# Patient Record
Sex: FEMALE | Race: WHITE | HISPANIC OR LATINO | Employment: FULL TIME | URBAN - METROPOLITAN AREA
[De-identification: names, ages, dates, MRNs, and addresses within clinical notes are randomized per-mention and may not be internally consistent; named-entity substitution may affect disease eponyms.]

---

## 2022-04-10 ENCOUNTER — OFFICE VISIT (OUTPATIENT)
Dept: URGENT CARE | Facility: URGENT CARE | Age: 22
End: 2022-04-10

## 2022-04-10 VITALS
HEART RATE: 87 BPM | OXYGEN SATURATION: 99 % | WEIGHT: 228.6 LBS | TEMPERATURE: 98.8 F | DIASTOLIC BLOOD PRESSURE: 88 MMHG | SYSTOLIC BLOOD PRESSURE: 135 MMHG

## 2022-04-10 DIAGNOSIS — K08.89 PAIN IN TOOTH: ICD-10-CM

## 2022-04-10 DIAGNOSIS — H66.002 NON-RECURRENT ACUTE SUPPURATIVE OTITIS MEDIA OF LEFT EAR WITHOUT SPONTANEOUS RUPTURE OF TYMPANIC MEMBRANE: ICD-10-CM

## 2022-04-10 DIAGNOSIS — R51.9 ACUTE INTRACTABLE HEADACHE, UNSPECIFIED HEADACHE TYPE: ICD-10-CM

## 2022-04-10 DIAGNOSIS — R07.0 THROAT PAIN: Primary | ICD-10-CM

## 2022-04-10 LAB
DEPRECATED S PYO AG THROAT QL EIA: NEGATIVE
GROUP A STREP BY PCR: NOT DETECTED

## 2022-04-10 PROCEDURE — 99203 OFFICE O/P NEW LOW 30 MIN: CPT | Performed by: NURSE PRACTITIONER

## 2022-04-10 PROCEDURE — 87651 STREP A DNA AMP PROBE: CPT | Performed by: NURSE PRACTITIONER

## 2022-04-10 RX ORDER — AMOXICILLIN 500 MG/1
500 CAPSULE ORAL 2 TIMES DAILY
Qty: 20 CAPSULE | Refills: 0 | Status: SHIPPED | OUTPATIENT
Start: 2022-04-10 | End: 2022-04-20

## 2022-04-10 RX ORDER — NAPROXEN 500 MG/1
500 TABLET ORAL 2 TIMES DAILY WITH MEALS
Qty: 20 TABLET | Refills: 0 | COMMUNITY
Start: 2022-04-10 | End: 2022-04-20

## 2022-04-10 NOTE — PROGRESS NOTES
Assessment & Plan     Throat pain  - Streptococcus A Rapid Screen w/Reflex to PCR  - Group A Streptococcus PCR Throat Swab    Non-recurrent acute suppurative otitis media of left ear without spontaneous rupture of tympanic membrane  - amoxicillin (AMOXIL) 500 MG capsule  Dispense: 20 capsule; Refill: 0    Acute intractable headache, unspecified headache type  - naproxen (NAPROSYN) 500 MG tablet  Dispense: 20 tablet; Refill: 0    Pain in tooth  - naproxen (NAPROSYN) 500 MG tablet  Dispense: 20 tablet; Refill: 0     RST negative.    TC  swab pending.    Amox for otitis.  And naproxen PRN pain.    Push fluids  Lots of handwashing.    Rest as able.   Will call if any other labs positive.    F/u in the clinic if symptoms persist or worsen.           Return in about 2 days (around 4/12/2022) for with regular provider if symptoms persist.    ADIEL Cabrera CNP  [facility] Nu[facility]ez is a 21 year old female who presents to clinic today for the following health issues:  Chief Complaint   Patient presents with     Urgent Care     Sore throat , headache a week and half ago, and month pain.     HPI    URI Adult    Onset of symptoms was 10 days and worsening 3 day(s) ago.  Course of illness is same.    Severity moderate  Current and Associated symptoms: stuffy nose, cough - non-productive, ear pain both and sore throat  Treatment measures tried include OTC Cough med, Fluids and Rest.  Predisposing factors include ill contact: Work.    Headache    Onset of symptoms was 1month(s).  Course of illness is waxing and waning  Severity moderate  Character of pain:dull and aching   Current and associated symptoms: nausea  Location of pain: right-sided  Prodromal sx?:  No  History of Migranes: No  Is headache similar to previous: Yes  Predisposing factors: None  Treatment and measures tried: Ibuprofen with temporary relief.        Review of Systems  Constitutional, HEENT,  cardiovascular, pulmonary, GI, , musculoskeletal, neuro, skin, endocrine and psych systems are negative, except as otherwise noted.      Objective    /88 (BP Location: Right arm, Patient Position: Sitting, Cuff Size: Adult Regular)   Pulse 87   Temp 98.8  F (37.1  C) (Tympanic)   Wt 103.7 kg (228 lb 9.6 oz)   SpO2 99%   Physical Exam   GENERAL: healthy, alert and no distress  EYES: Eyes grossly normal to inspection, PERRL and conjunctivae and sclerae normal  HENT: bilateral TM bulging with mucopurulent fluid.  nose and mouth without ulcers or lesions  NECK: no adenopathy, no asymmetry, masses, or scars and thyroid normal to palpation  RESP: lungs clear to auscultation - no rales, rhonchi or wheezes  CV: regular rate and rhythm, normal S1 S2, no S3 or S4, no murmur, click or rub, no peripheral edema and peripheral pulses strong  ABDOMEN: soft, nontender, no hepatosplenomegaly, no masses and bowel sounds normal  MS: no gross musculoskeletal defects noted, no edema  SKIN: no suspicious lesions or rashes  NEURO: Normal strength and tone, sensory exam grossly normal, mentation intact and cranial nerves 2-12 intact  PSYCH: mentation appears normal, affect normal/bright    Results for orders placed or performed in visit on 04/10/22   Streptococcus A Rapid Screen w/Reflex to PCR     Status: Normal    Specimen: Throat; Swab   Result Value Ref Range    Group A Strep antigen Negative Negative

## 2024-03-10 ENCOUNTER — HEALTH MAINTENANCE LETTER (OUTPATIENT)
Age: 24
End: 2024-03-10

## 2024-03-23 ENCOUNTER — OFFICE VISIT (OUTPATIENT)
Dept: URGENT CARE | Facility: URGENT CARE | Age: 24
End: 2024-03-23

## 2024-03-23 VITALS
HEART RATE: 63 BPM | RESPIRATION RATE: 18 BRPM | WEIGHT: 245 LBS | DIASTOLIC BLOOD PRESSURE: 85 MMHG | TEMPERATURE: 98.7 F | OXYGEN SATURATION: 99 % | SYSTOLIC BLOOD PRESSURE: 127 MMHG

## 2024-03-23 DIAGNOSIS — K04.7 DENTAL ABSCESS: Primary | ICD-10-CM

## 2024-03-23 PROCEDURE — 99203 OFFICE O/P NEW LOW 30 MIN: CPT | Performed by: PHYSICIAN ASSISTANT

## 2024-03-23 RX ORDER — FLUCONAZOLE 150 MG/1
TABLET ORAL
Qty: 1 TABLET | Refills: 0 | Status: SHIPPED | OUTPATIENT
Start: 2024-03-23 | End: 2024-04-02

## 2024-03-23 RX ORDER — AMOXICILLIN 875 MG
875 TABLET ORAL 2 TIMES DAILY
Qty: 20 TABLET | Refills: 0 | Status: SHIPPED | OUTPATIENT
Start: 2024-03-23 | End: 2024-04-02

## 2024-03-23 RX ORDER — IBUPROFEN 800 MG/1
800 TABLET, FILM COATED ORAL EVERY 8 HOURS PRN
Qty: 40 TABLET | Refills: 0 | Status: SHIPPED | OUTPATIENT
Start: 2024-03-23 | End: 2024-04-02

## 2024-03-23 NOTE — PATIENT INSTRUCTIONS
(K04.7) Dental abscess  (primary encounter diagnosis)  Comment:   Plan: fluconazole (DIFLUCAN) 150 MG tablet,         amoxicillin (AMOXIL) 875 MG tablet, ibuprofen         (ADVIL/MOTRIN) 800 MG tablet            Establish care with dentist and follow up with them as this may recur or persist.

## 2024-03-23 NOTE — PROGRESS NOTES
Patient presents with:  Dental Pain: Possible abscess on the right lower tooth since yesterday. States there is pus coming out     (K04.7) Dental abscess  (primary encounter diagnosis)  Comment:   Plan: fluconazole (DIFLUCAN) 150 MG tablet,         amoxicillin (AMOXIL) 875 MG tablet, ibuprofen         (ADVIL/MOTRIN) 800 MG tablet            Establish care with dentist and follow up with them as this may recur or persist.      At the end of the encounter, I discussed results, diagnosis, medications. Discussed red flags for immediate return to clinic/ER, as well as indications for follow up if no improvement. Patient understood and agreed to plan. Patient was stable for discharge     Follow up with Dentist.  List of emergency and sliding fee scale dental clinics given to patient.      SUBJECTIVE:   Savannah Ellis is a 23 year old female who presents today with right lower gum/toot pain for the past week.  Purulent drainage is present.      No fevers.     Headache.      Tajik telephone  is used for communication during clinic visit today.      Current Outpatient Medications   Medication Sig Dispense Refill    Multiple Vitamins-Iron (DAILY-WINSTON/IRON/BETA-CAROTENE) TABS TAKE 1 TABLET BY MOUTH DAILY. (Patient not taking: Reported on 10/19/2020) 30 tablet 7     Social History     Tobacco Use    Smoking status: Never Smoker    Smokeless tobacco: Never Used   Substance Use Topics    Alcohol use: Not on file     Family History   Problem Relation Age of Onset    Diabetes Mother     Diabetes Father          ROS:    10 point ROS of systems including Constitutional, Eyes, Respiratory, Cardiovascular, Gastroenterology, Genitourinary, Integumentary, Muscularskeletal, Psychiatric ,neurological were all negative except for pertinent positives noted in my HPI       OBJECTIVE:  /85 (BP Location: Right arm, Patient Position: Sitting, Cuff Size: Adult Regular)   Pulse 63   Temp 98.7  F (37.1  C) (Oral)   Resp 18    Wt 111.1 kg (245 lb)   SpO2 99%   Physical Exam:  GENERAL APPEARANCE: healthy, alert and no distress  OP: right lower tooth second from back is tender to tapping and surrounding gingiva is erythematous and edematous.    NECK: supple, with tender right anterior cervical lymphadenopathy  SKIN: no suspicious lesions or rashes

## 2024-03-23 NOTE — LETTER
March 23, 2024      Savannah Ellis  [address] [place] [address]        To Whom It May Concern:    Savannah Ellis was seen in our clinic. She may return to work  on Tuesday.      Sincerely,      Melissa DICKSON, PAMARCI

## 2024-04-01 ENCOUNTER — VIRTUAL VISIT (OUTPATIENT)
Dept: MIDWIFE SERVICES | Facility: CLINIC | Age: 24
End: 2024-04-01

## 2024-04-01 DIAGNOSIS — N91.2 AMENORRHEA: Primary | ICD-10-CM

## 2024-04-01 PROCEDURE — 99204 OFFICE O/P NEW MOD 45 MIN: CPT | Mod: 95 | Performed by: ADVANCED PRACTICE MIDWIFE

## 2024-04-01 NOTE — PROGRESS NOTES
S:  Savannah is here via video visit.  used via telephone on speaker phone to communicate with patient.     Savannah is here for assessment of amenorrhea. She hasn't gotten period for 1 year. Before getting  in 2019 she took Plan B frequently and then periods became irregular. Has taken multiple at home pregnancy tests that are negative. Savannah reports normal menses prior to 2017. Periods would be every month lasting about 5 days. Was  from 2019 to 2020. Period was irregular from 2017 to present. While , she wanted to get pregnant but couldn't. Sometimes she will get period, then won't have it for a year. Then will have it every few years.     About 3 years ago in [place] they diagnosed her with cysts on ovaries. She isn't quite sure if the cysts were on her ovaries or somewhere else in pelvis. Doctor told her that cysts were small and would disappear. Unsure what type of cysts.    Denies symptoms of pelvic pain.    Hair growth: yes, endorses hair growth on chin  Hair loss on head: yes, had hair loss near temples last year, this year stable  Acne: No, but occasional acne on chin    A/P:  (N91.2) Amenorrhea  (primary encounter diagnosis)  Comment: Plan for workup including TSH, hcg, prolactin, and TVUS. If all negative, try Provera challenge. Consider PCOS diagnosis meeting [place] criteria of irregular periods, hirsuitism. Will assess ovaries on ultrasound. Unsure of patient goals. She will have visit after ultrasound to further assess. If she desires pregnancy, refer to OBGYN for ovulation induction.   Plan: Prolactin, TSH with free T4 reflex, HCG         quantitative pregnancy, US Transvaginal Pelvic         Non-OB          ADIEL Musa CNM        Virtual Visit Details    Type of service:  Video Visit   Video Start Time:  1456  Video End Time:3:25 PM    Originating Location (pt. Location): Home  Distant Location (provider location):  On-site  Platform  used for Video Visit: Radha

## 2024-04-02 ENCOUNTER — OFFICE VISIT (OUTPATIENT)
Dept: FAMILY MEDICINE | Facility: CLINIC | Age: 24
End: 2024-04-02

## 2024-04-02 VITALS
SYSTOLIC BLOOD PRESSURE: 130 MMHG | RESPIRATION RATE: 16 BRPM | HEART RATE: 95 BPM | BODY MASS INDEX: 41.83 KG/M2 | TEMPERATURE: 99.8 F | WEIGHT: 245 LBS | OXYGEN SATURATION: 97 % | DIASTOLIC BLOOD PRESSURE: 78 MMHG | HEIGHT: 64 IN

## 2024-04-02 DIAGNOSIS — R43.2 LOSS OF TASTE: ICD-10-CM

## 2024-04-02 DIAGNOSIS — K04.7 DENTAL ABSCESS: Primary | ICD-10-CM

## 2024-04-02 LAB
FLUAV RNA SPEC QL NAA+PROBE: NEGATIVE
FLUBV RNA RESP QL NAA+PROBE: POSITIVE
RSV RNA SPEC NAA+PROBE: NEGATIVE
SARS-COV-2 RNA RESP QL NAA+PROBE: NEGATIVE

## 2024-04-02 PROCEDURE — T1013 SIGN LANG/ORAL INTERPRETER: HCPCS | Mod: U4

## 2024-04-02 PROCEDURE — 87637 SARSCOV2&INF A&B&RSV AMP PRB: CPT

## 2024-04-02 PROCEDURE — 99203 OFFICE O/P NEW LOW 30 MIN: CPT

## 2024-04-02 RX ORDER — DOXYCYCLINE 100 MG/1
100 CAPSULE ORAL 2 TIMES DAILY
Qty: 20 CAPSULE | Refills: 0 | Status: SHIPPED | OUTPATIENT
Start: 2024-04-02 | End: 2024-04-12

## 2024-04-02 NOTE — LETTER
April 2, 2024      Savannah Gillis  [address] [place] [place] [address]        To Whom It May Concern:    Savannah Gillis  was seen on 4/2/2024.  Please excuse her  until 4/3/2024 due to illness.        Sincerely,        ADIEL Hanson CNP

## 2024-04-02 NOTE — PATIENT INSTRUCTIONS
I am worried about the infection in your mouth.  I have concerned that the antibiotic that you were given in the urgent care is not treating this infection well.  For this reason, I am going to prescribe you different antibiotic that may do a better job of treating your infection.  You will take this antibiotic twice a day for 10 days.  It is very important that you complete the entire course of the antibiotic regardless of symptom improvement.  I would like you to take it with some food, as this medication can cause some stomach upset.  Common side effects of antibiotics including mild stomach upset and some diarrhea.  Side effects that are not expected would include persistent vomiting and a full body rash.  If you experience either of those symptoms, please stop taking the antibiotic and let me know right away.    As we discussed, this type of infection warrants very close monitoring, as infections in the mouth can easily spread to the throat and other deeper tissue areas in the face.  If you are not seeing improvement in your symptoms in the next 2 days, I would like you to go to the emergency room immediately.     If you feel that symptoms are improving but not resolving after 1 week of treatment, please follow-up with your primary care provider.    Please also seek immediate medical attention with symptoms including a high-grade fever over 103, chest pain, shortness of breath, persistent vomiting, difficulty swallowing, facial swelling, or severe facial pain

## 2024-04-02 NOTE — PROGRESS NOTES
Assessment & Plan     (K04.7) Dental abscess  (primary encounter diagnosis)  Comment: Acute and worsening. No signs of respiratory distress, vital signs stable, and no red flag signs requiring immediate need for medical attention.  No concern for peritonsillar abscess or epiglottitis.  Patient is still managing oral secretions without concern, and is eating and drinking well.  Vital signs stable despite very low-grade fever.  Physical exam findings otherwise unremarkable.  Concerned that amoxicillin is not well covering patient's dental concerns, and would likely benefit from doxycycline management.  Have some concern for dental abscess turning into peritonsillar concerning if not treated well.  Discussed with patient the importance of seeking immediate medical attention if she is not seeing symptom improvement in the next 2 days, or if symptoms worsen at all over time.  Would likely need IV antibiotics if she is not seeing improvement with oral doxycycline.  All education was given to patient with facilitation from a  via the phone.  Extensive amount of time spent educating patient on antibiotic dosing, possible side effects, and possible adverse effects.  Also given extensive education on symptom monitoring, symptoms that would require need for immediate medical attention.  Patient verbalized understanding of the plan of care  Plan: doxycycline hyclate (VIBRAMYCIN) 100 MG capsule    (R43.2) Loss of taste  Comment: Acute.  Differential diagnosis includes sinusitis related to dental abscess versus COVID 19.  Discussed with patient that results of viral panel would not necessarily change our course of treatment, patient verbalized that she would like this test done.  Will move forward with testing patient for influenza, COVID, and RSV to rule out these diagnoses as a cause of her loss of taste and smell.  Plan: Symptomatic Influenza A/B, RSV, & SARS-CoV2 PCR        (COVID-19) Nasopharyngeal       Prescription drug management  I spent a total of 32 minutes on the day of the visit.   Time spent by me doing chart review, history and exam, documentation and further activities per the note      FUTURE APPOINTMENTS:       - Follow-up visit in 1 week if symptoms do not improve       - Follow-up for annual visit or as needed  Patient Instructions   I am worried about the infection in your mouth.  I have concerned that the antibiotic that you were given in the urgent care is not treating this infection well.  For this reason, I am going to prescribe you different antibiotic that may do a better job of treating your infection.  You will take this antibiotic twice a day for 10 days.  It is very important that you complete the entire course of the antibiotic regardless of symptom improvement.  I would like you to take it with some food, as this medication can cause some stomach upset.  Common side effects of antibiotics including mild stomach upset and some diarrhea.  Side effects that are not expected would include persistent vomiting and a full body rash.  If you experience either of those symptoms, please stop taking the antibiotic and let me know right away.    As we discussed, this type of infection warrants very close monitoring, as infections in the mouth can easily spread to the throat and other deeper tissue areas in the face.  If you are not seeing improvement in your symptoms in the next 2 days, I would like you to go to the emergency room immediately.     If you feel that symptoms are improving but not resolving after 1 week of treatment, please follow-up with your primary care provider.    Please also seek immediate medical attention with symptoms including a high-grade fever over 103, chest pain, shortness of breath, persistent vomiting, difficulty swallowing, facial swelling, or severe facial pain    Abhinav Nuñez is a 23 year old, presenting for the following health issues:  Throat Problem  (Sore throat and voice hoariness X 1 week (was seen in  3/23/24 for dental abscess) ) and Ear Problem (Rt ear pains/fullness x 2 days )      4/2/2024     2:47 PM   Additional Questions   Roomed by KELSI Gonzalez   Accompanied by alone         4/2/2024     2:47 PM   Patient Reported Additional Medications   Patient reports taking the following new medications none     Ear Problem    History of Present Illness       Reason for visit:  Dental prblem  Symptom onset:  3-7 days ago  Symptom intensity:  Moderate  Symptom progression:  Improving  Had these symptoms before:  No  What makes it worse:  Infection on my throat  What makes it better:  Some injection cause i took pills and its not working    She eats 0-1 servings of fruits and vegetables daily.She consumes 2 sweetened beverage(s) daily.She exercises with enough effort to increase her heart rate 9 or less minutes per day.  She exercises with enough effort to increase her heart rate 3 or less days per week.   She is taking medications regularly.  Savannah is a 23-year-old female with no significant past medical history who presents today with concerns for oral abscess.  Patient was seen in the urgent care on March 23 with concern for oral abscess.  She was prescribed amoxicillin, ibuprofen, and Diflucan at that time to manage her concerns.  Patient follows up today, as she has ongoing concern for worsening infection.  She notes that about 2 days after she was prescribed these medications to manage her concerns, she began having headache, fever, worsening oral pain, worsening swelling and discharge from the tooth, cough, cold symptoms, or loss of taste and smell, and pressure in her right ear.  Patient reports that she is still able to swallow, but she has a lot of pain when eating and drinking.  She notes that she has had a low-grade fever as well.  She reports that on day 4 of antibiotic use, she did have some loose stool and vomiting, but has not had persistent  "vomiting since this time.  Any chest pain, shortness of breath, wheezing, or joint pain.  She declines any facial swelling, but does note some discomfort when palpating the right side of her neck and face.      Review of Systems  Constitutional, HEENT, cardiovascular, pulmonary, gi and gu systems are negative, except as otherwise noted.      Objective    /78 (BP Location: Left arm, Patient Position: Sitting, Cuff Size: Adult Regular)   Pulse 95   Temp 99.8  F (37.7  C) (Tympanic)   Resp 16   Ht 1.626 m (5' 4\")   Wt 111.1 kg (245 lb)   LMP  (LMP Unknown)   SpO2 97%   BMI 42.05 kg/m    Body mass index is 42.05 kg/m .  Physical Exam   GENERAL: alert and no distress  EYES: Eyes grossly normal to inspection, PERRL and conjunctivae and sclerae normal  HENT: normal cephalic/atraumatic, right ear: clear effusion and bulging membrane, left ear: bulging membrane, nose and mouth without ulcers or lesions, oral mucous membranes moist, and no tonsillar hypertrophy, exudate, or erythema.  Erythema surrounding right posterior aspect of mouth with purulent discharge at the site.  Significant edema surrounding the gingival aspect of the tooth.  No bleeding appreciated.  Patient managing oral secretions without concern for or peritonsillar abscess  NECK: no adenopathy, no asymmetry, masses, or scars  RESP: lungs clear to auscultation - no rales, rhonchi or wheezes  CV: regular rate and rhythm, normal S1 S2, no S3 or S4, no murmur, click or rub, no peripheral edema  ABDOMEN: soft, nontender, no hepatosplenomegaly, no masses and bowel sounds normal  MS: no gross musculoskeletal defects noted, no edema  SKIN: no suspicious lesions or rashes    Ramona Bliss DNP FNP-C  Family Nurse Practitioner - Same Day Provider  [facility]          Signed Electronically by: ADIEL Hanson CNP    "

## 2024-04-12 ENCOUNTER — APPOINTMENT (OUTPATIENT)
Dept: INTERPRETER SERVICES | Facility: CLINIC | Age: 24
End: 2024-04-12

## 2024-08-27 ENCOUNTER — OFFICE VISIT (OUTPATIENT)
Dept: URGENT CARE | Facility: URGENT CARE | Age: 24
End: 2024-08-27

## 2024-08-27 VITALS
TEMPERATURE: 98.2 F | OXYGEN SATURATION: 98 % | HEART RATE: 89 BPM | SYSTOLIC BLOOD PRESSURE: 112 MMHG | WEIGHT: 250 LBS | DIASTOLIC BLOOD PRESSURE: 77 MMHG | BODY MASS INDEX: 42.91 KG/M2

## 2024-08-27 DIAGNOSIS — K04.7 DENTAL INFECTION: Primary | ICD-10-CM

## 2024-08-27 PROCEDURE — 99214 OFFICE O/P EST MOD 30 MIN: CPT | Performed by: FAMILY MEDICINE

## 2024-08-27 RX ORDER — IBUPROFEN 800 MG/1
800 TABLET, FILM COATED ORAL EVERY 8 HOURS PRN
Qty: 15 TABLET | Refills: 0 | Status: SHIPPED | OUTPATIENT
Start: 2024-08-27 | End: 2024-09-01

## 2024-08-27 RX ORDER — HYDROCODONE BITARTRATE AND ACETAMINOPHEN 5; 325 MG/1; MG/1
1 TABLET ORAL EVERY 6 HOURS PRN
Qty: 8 TABLET | Refills: 0 | Status: SHIPPED | OUTPATIENT
Start: 2024-08-27 | End: 2024-08-29

## 2024-08-27 RX ORDER — ACETAMINOPHEN 500 MG
500-1000 TABLET ORAL EVERY 6 HOURS PRN
COMMUNITY

## 2024-08-27 NOTE — PROGRESS NOTES
SUBJECTIVE: Savannah Gillis is a 23 year old female presenting with a chief complaint of rt lower dental pain.  Onset of symptoms was  day(s) ago.  Course of illness is worsening.      No past medical history on file.  No Known Allergies  Social History     Tobacco Use    Smoking status: Never    Smokeless tobacco: Never   Substance Use Topics    Alcohol use: Not on file       ROS:  SKIN: no rash  GI: no vomiting    OBJECTIVE:  /77   Pulse 89   Temp 98.2  F (36.8  C) (Tympanic)   Wt 113.4 kg (250 lb)   SpO2 98%   BMI 42.91 kg/m  GENERAL APPEARANCE: healthy, alert and no distress  EYES: EOMI,  PERRL, conjunctiva clear  HENT: rt lower dental pain  SKIN: no suspicious lesions or rashes      ICD-10-CM    1. Dental infection  K04.7 amoxicillin-clavulanate (AUGMENTIN) 875-125 MG tablet     HYDROcodone-acetaminophen (NORCO) 5-325 MG tablet     ibuprofen (ADVIL/MOTRIN) 800 MG tablet        Keep appointment with dental next week  Fluids/Rest, f/u if worse/not any better

## 2025-03-16 ENCOUNTER — HEALTH MAINTENANCE LETTER (OUTPATIENT)
Age: 25
End: 2025-03-16